# Patient Record
Sex: FEMALE | Race: WHITE | HISPANIC OR LATINO | URBAN - METROPOLITAN AREA
[De-identification: names, ages, dates, MRNs, and addresses within clinical notes are randomized per-mention and may not be internally consistent; named-entity substitution may affect disease eponyms.]

---

## 2017-11-26 ENCOUNTER — EMERGENCY (EMERGENCY)
Facility: HOSPITAL | Age: 27
LOS: 1 days | Discharge: HOME | End: 2017-11-26
Attending: EMERGENCY MEDICINE | Admitting: EMERGENCY MEDICINE
Payer: COMMERCIAL

## 2017-11-26 VITALS
HEART RATE: 68 BPM | DIASTOLIC BLOOD PRESSURE: 71 MMHG | OXYGEN SATURATION: 100 % | SYSTOLIC BLOOD PRESSURE: 106 MMHG | WEIGHT: 132.06 LBS | HEIGHT: 62 IN | TEMPERATURE: 98 F | RESPIRATION RATE: 16 BRPM

## 2017-11-26 PROCEDURE — 99284 EMERGENCY DEPT VISIT MOD MDM: CPT

## 2017-11-26 PROCEDURE — 73630 X-RAY EXAM OF FOOT: CPT

## 2017-11-26 PROCEDURE — 70450 CT HEAD/BRAIN W/O DYE: CPT | Mod: 26

## 2017-11-26 PROCEDURE — 73630 X-RAY EXAM OF FOOT: CPT | Mod: 26,RT

## 2017-11-26 PROCEDURE — 99284 EMERGENCY DEPT VISIT MOD MDM: CPT | Mod: 25

## 2017-11-26 PROCEDURE — 70450 CT HEAD/BRAIN W/O DYE: CPT

## 2017-11-26 RX ORDER — ACETAMINOPHEN 500 MG
650 TABLET ORAL ONCE
Qty: 0 | Refills: 0 | Status: COMPLETED | OUTPATIENT
Start: 2017-11-26 | End: 2017-11-26

## 2017-11-26 RX ADMIN — Medication 650 MILLIGRAM(S): at 16:07
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
## 2017-11-26 NOTE — ED PROVIDER NOTE - PROGRESS NOTE DETAILS
2751 Victor Hugo Robertson MD: The patient’s cervical collar was clinically cleared: there is no focal neurologic deficit, no midline cervical spine tenderness is present.  There is a normal level of consciousness, the patient is not intoxicated, and no distracting injury is present.   the ct head and neck are within normal limits, patient understands results and need to  follow up as needed.  No immediate life threatening issues present on history or clinical exam. Patient is a safe disposition home, has capacity and insight into their condition, ambulatory in the emergency department and will follow up with their doctor(s) this week. Patient  understands anticipatory guidance and was given strict return and follow up precautions. The patient has been informed of all concerning signs and symptoms to return to Emergency Department, the necessity to follow up with the PMD/Clinic/follow up provided within 2-3 days was explained, and the patient reports understanding of above with capacity and insight.

## 2017-11-26 NOTE — ED PROVIDER NOTE - PHYSICAL EXAMINATION
johnny aaaox3 nad ncat perrl no midline cspin e ttp gcs15 s1s2 rrrctabl stregth 5/5 ue le bl cn2-12intact no drift abd soft nt   rt foot echymosis ttp base of 5th sensation intact
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
## 2022-10-22 ENCOUNTER — EMERGENCY (EMERGENCY)
Facility: HOSPITAL | Age: 32
LOS: 1 days | Discharge: HOME | End: 2022-10-22
Attending: EMERGENCY MEDICINE
Payer: MEDICAID

## 2022-10-22 VITALS
DIASTOLIC BLOOD PRESSURE: 76 MMHG | TEMPERATURE: 98 F | RESPIRATION RATE: 18 BRPM | OXYGEN SATURATION: 98 % | HEART RATE: 66 BPM | SYSTOLIC BLOOD PRESSURE: 115 MMHG

## 2022-10-22 VITALS
HEIGHT: 62 IN | DIASTOLIC BLOOD PRESSURE: 81 MMHG | RESPIRATION RATE: 20 BRPM | SYSTOLIC BLOOD PRESSURE: 111 MMHG | HEART RATE: 70 BPM | TEMPERATURE: 98 F | WEIGHT: 145.95 LBS | OXYGEN SATURATION: 99 %

## 2022-10-22 PROCEDURE — 93971 EXTREMITY STUDY: CPT | Mod: 26,RT

## 2022-10-22 PROCEDURE — 99284 EMERGENCY DEPT VISIT MOD MDM: CPT

## 2022-10-22 PROCEDURE — 99284 EMERGENCY DEPT VISIT MOD MDM: CPT | Mod: 25

## 2022-10-22 PROCEDURE — 93971 EXTREMITY STUDY: CPT

## 2022-10-22 PROCEDURE — 73030 X-RAY EXAM OF SHOULDER: CPT | Mod: 26,RT

## 2022-10-22 PROCEDURE — 73030 X-RAY EXAM OF SHOULDER: CPT

## 2022-10-22 RX ORDER — ACETAMINOPHEN 500 MG
975 TABLET ORAL ONCE
Refills: 0 | Status: COMPLETED | OUTPATIENT
Start: 2022-10-22 | End: 2022-10-22

## 2022-10-22 RX ORDER — LIDOCAINE 4 G/100G
1 CREAM TOPICAL ONCE
Refills: 0 | Status: COMPLETED | OUTPATIENT
Start: 2022-10-22 | End: 2022-10-22

## 2022-10-22 RX ORDER — IBUPROFEN 200 MG
600 TABLET ORAL ONCE
Refills: 0 | Status: COMPLETED | OUTPATIENT
Start: 2022-10-22 | End: 2022-10-22

## 2022-10-22 RX ADMIN — Medication 975 MILLIGRAM(S): at 16:42

## 2022-10-22 RX ADMIN — Medication 600 MILLIGRAM(S): at 16:42

## 2022-10-22 RX ADMIN — LIDOCAINE 1 PATCH: 4 CREAM TOPICAL at 16:41

## 2022-10-22 NOTE — ED PROVIDER NOTE - PATIENT PORTAL LINK FT
You can access the FollowMyHealth Patient Portal offered by [facility] by registering at the following website: [email or web address]. By joining [email or web address]’s FollowMyHealth portal, you will also be able to view your health information using other applications (apps) compatible with our system.

## 2022-10-22 NOTE — ED PROVIDER NOTE - PHYSICAL EXAMINATION
NAD. VSS. Afebrile, Lungs clear. No spinal midline tender. +Right cervical trapezium tender. +Right ant/medial shoulder tender, full ROMs, no obvious swelling. N/V- intact.

## 2022-10-22 NOTE — ED PROVIDER NOTE - NSFOLLOWUPINSTRUCTIONS_ED_ALL_ED_FT
Please see the information of shoulder pain.    Take Ibuprofen or/Tylenol for pain as package directed.    Salonpas with Lidocaine patch to pain area as package directed.     Follow up with your orthopedist or sports medicine clinic ([phone]), call Monday for appointment.    Return for any concerns, fever, chills, numbness, weakness, or worsening pain.

## 2022-10-22 NOTE — ED PROVIDER NOTE - CLINICAL SUMMARY MEDICAL DECISION MAKING FREE TEXT BOX
R shoulder pain, atraumatic, described on medial/inner upper arm, less so on lateral.  No paresthesias or neuro deficits.  Soft compartments and good distal pulses.  Likely MSK strain/RTC syndrome but given location of pain (medial arm) will eval for deep venous thrombosis though less likely.  XR to eval for calc tendinitis.  Presuming non-actionable imaging will d/c c ortho /sports f/u.  --BMM

## 2022-10-22 NOTE — ED PROVIDER NOTE - NS ED ATTENDING STATEMENT MOD
This was a shared visit with the ELISE. I reviewed and verified the documentation and independently performed the documented:

## 2022-10-22 NOTE — ED ADULT NURSE NOTE - OBJECTIVE STATEMENT
31 y/o F A&Ox3 denies pertinent PMH/PSH presents to the ED from home c/o shoulder pain. Pt reports atraumatic right shoulder pain x1 weeks. Upon ED arrival pt is well appearing. Breathing is even and unlabored. Skin is warm, dry & in tact. On assessment pt has full ROM to right arm, positive sensation. No obvious signs of injury or gross deformity noted. Denies CP, SOB, HA, fever, chills, numbness, tingling. Comfort & safety provided.

## 2022-10-22 NOTE — ED PROVIDER NOTE - OBJECTIVE STATEMENT
no PMHx  right dominant pain for one week, no inj  Took Tylenol without relief. 31yo female pt, no PMHx, c/o right dominant pain for one week and noticed worsening pain with movement. Denies injury or heavy lifting. She also felt right sided neck pain. Denies headache, dizziness or visual changes. Denies sensory changes or weakness to extremities. She took Tylenol without relief. Denies CP/SOB/ABD pain. Denies fever, chills, cough or congestion.

## 2022-11-22 ENCOUNTER — APPOINTMENT (OUTPATIENT)
Dept: SPORTS MEDICINE | Facility: CLINIC | Age: 32
End: 2022-11-22

## 2022-11-22 DIAGNOSIS — M25.511 PAIN IN RIGHT SHOULDER: ICD-10-CM

## 2022-11-22 PROCEDURE — 99204 OFFICE O/P NEW MOD 45 MIN: CPT

## 2022-11-22 RX ORDER — MELOXICAM 7.5 MG/1
7.5 TABLET ORAL DAILY
Qty: 30 | Refills: 1 | Status: ACTIVE | COMMUNITY
Start: 2022-11-22 | End: 1900-01-01

## 2022-11-22 NOTE — PHYSICAL EXAM
[de-identified] : Shoulder Exam (Bilateral)\par \par General Appearance: Well-nourished, well developed in no acute distress Orientation: Oriented to person, place and time. Lungs: nonlabored breathing, no stridor, no audible wheezing\par \par C spine: full painless ROM, negative spurlings\par Inspection/Palpation UE (R/L): TTP to area just below right coracoid process\par \par Scapulothoracic motion: No scapular dyskinesis \par ROM: Active FF (R/L): 180 / 180, Passive FF (R/L): 180 / 180, \par Active Abduction (R/L): 170 / 170, Passive Abduction (R/L): 170 / 170, \par External Rotation at side (R/L): 60 / 60, Internal Rotation (R/L): T12/T12\par \par Abduction Strength (R/L): 5/5 / 5/5 \par ER strength (R/L): 5/5 / 5/5 \par IR strength (R/L): 5/5 / 5/5 \par Elbow flexion strength (R/L):5/5 / 5/5 \par Elbow Extension strength (R/L):5/5 / 5/5 \par Intrinsics of hand strength (R/L): 5/5 / 5/5\par \par Cross Arm Adduction (R/L): neg / neg\par Belly Press Test (R/L): neg /neg\par Lift off Test (R/L): neg /neg \par Drop arm (R/L): neg/neg \par Neer Impingement Test (R/L): pos / neg\par Empty Can Test (R/L): neg / neg \par Sharma Test (R/L): pos / neg\par Sheri’s Test (R/L): neg /neg \par Yergusons Test (R/L): neg / neg \par Speeds Test (R/L): pos / neg\par \par O’Pito’s Test (R/L): pos / neg \par Apprehension Test (R/L): neg /neg\par Apprehension Test (R/L): neg /neg\par Sulcus Sign (R/L): neg /neg\par \par Elbow Exam (Bilateral)\par \par General Appearance: Well-nourished, well developed in no acute distress \par Orientation: Oriented to person, place and time. \par Gait: normal Non-labored breathing, no stridor \par C spine: full painless ROM, no midline spinal tenderness \par \par Inspection/Palpation \par UE (R/L): mild TTP above medial cubital tunnel, but not over medial, lateral epicondyle and olecranon, \par No thenar or hypothenar atrophy \par Elbow ROM (R/L): \par Flexion 0-140/ 0-140, \par Supination (R/L): 80/80, \par Pronation (R/L): 80/80 \par Elbow Stability (R/L): no varus or valgus laxity bilaterally \par Elbow Flexion Strength (R/L): 5/5 / 5/5\par Elbow Extension Strength (R/L): 5/5 / 5/5 \par Wrist Extension Strength (R/L): 5/5 / 5/5\par Wrist Flexion Strength (R/L): 5/5 / 5/5 \par Intrinsics of Hand Strength (R/L): 5/5 / 5/5 \par Sensation: Subjective normal median, ulnar, radial and axillary sensation bilaterally\par mild Ulnar distribution numbness with elbow flexion for >30 seconds does occur\par \par Tinel’s Sign (R/L): neg /neg\par Calderón Test (R/L): neg /neg (elbow extension, wrist pronation and flexion)\par Maudley’s Test (R/L): neg /neg (resisted middle digit extension)\par Golfer’s elbow Test (R/L): neg /neg (resisted elbow and wrist extension)\par Pain with passive extension of wrist and fingers (R/L): neg /neg \par Vasculature: 2+ radial pulse bilaterally \par UE Skin (R/L): no rashes or lesions bilaterally \par DTR UE (R/L): Biceps (2+/2+), Triceps (2+/2+)   [de-identified] : X-ray of right shoulder performed at [facility]  ED on 10/22/2022: No acute fracture or dislocation

## 2022-11-22 NOTE — HISTORY OF PRESENT ILLNESS
[de-identified] : 32-year-old female with no significant past medical history presenting with acute pain of the right elbow and shoulder that began approximately 3 weeks ago.  Patient states that she woke up 1 morning to have this pain occurring in the area.  Cannot recall an inciting incident.  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  Does not engage in any significant physical activity otherwise.  Has never had issues with in this upper extremity in the past.  Reports that she is having increasing trouble lifting her arm up over her head, reports occasional shooting pain going down the inside aspect of her right arm, reports occasional numbness and tingling in her pinky and ring finger in the hand of that arm.  Has been taking the Tylenol with only mild relief.  Denies any neck pain, vision or hearing changes.was seen at the [facility] ED and had a right shoulder that did not reveal any jo ann pathology.

## 2022-11-22 NOTE — DISCUSSION/SUMMARY
[de-identified] : Chaitanya fellow note:\par \par Impression: Right shoulder internal derangement versus nerve entrapment.\par \par Physical exam suggesting some signs of internal shoulder derangement with issues with abduction as well as signs of impingement, patient noted to have deconditioned muscles in the right upper extremity, will advise patient to follow-up with physical therapy, prescription provided, will order MRI of the right shoulder, patient advised to return for follow-up in 2 weeks following MRI results.  Patient understood instructions, was provided with a prescription for Mobic and discharged.\par \par Attending Attestation:\par \par I saw and evaluated the patient and was involved with and agree with the fellow's history, physical exam, and I personally documented the assessment and plan of care.\par \par ASSESSMENT: \par \par Right-hand-dominant patient who works as a  who presents with right shoulder pain for the last several weeks.  No trauma or acute inciting event.  Patient's pain is noted over the medial aspects of the shoulder and radiates to the medial elbow, as well as getting sharp pain and tingling and numbness to the fourth and fifth digit.  Patient without any neck pain associated.\par \par Consider shoulder pathology including rotator cuff tendinopathy including the supraspinatus and subscapularis, short head of biceps at the insertion on the coracoid, shoulder impingement, bursitis, labral tear, biceps tendinopathy.  We will obtain MRI to assess for acute shoulder pathology.\par Patient also likely has peripheral neuropathy involving the ulnar nerve.  Patient with no neck pain, and on examination has normal range of motion of the neck which does not reproduce symptoms and negative Spurling test.  There is no overt numbness, decreased sensation or decreased strength on examination.  Likely has ulnar nerve impingement at the level of the upper arm.  We will trial a course of NSAID, and reevaluate patient, will consider ultrasound-guided ulnar nerve Hydro dissection at the mid upper arm and possible corticosteroid injection.\par \par Will treat symptoms with conservative management with activity modification, analgesic medications, HEP/PT, and re-evaluate the patient to see if they would benefit from further diagnostic testing, or referral for injection therapy or surgical intervention.\par \par Clinical and radiographic findings discussed. Diagnosis, prognosis and treatment options reviewed. Patient verbalizes understanding and all questions answered.\par The patient adamant at this time about not having any type of surgical intervention. Wants only to continue with non-operative management. \par \par Patient adamant at this time about not having any type of Sx intervention. Wants only to continue with conservative approach.\par \par PLAN:\par Additional Testing: MRI of the right shoulder\par Further Intervention: Patient would like to continue with non-interventional measures\par Weight-bearing Status: WBAT\par Assistive Devices: None\par Therapy: HEP recommended and reviewed, PT prescribed\par Health Management: BMI/Weight Management and physical activity level reviewed with the patient\par Home Modalities: RICE protocol for pain/swelling and home modalities reviewed with the patient\par Medications: OTC analgesics\par -Prescription for Meloxicam. Patient reports that she is not pregnant. Advised about adverse effects and recommended taking medication with food, and to avoid if there is any GI upset or history of renal issues, as there is potential to cause PUD, GIB, and JACQUES if taken chronically or in large doses. I recommended the patient to primarily take Tylenol for pain and take the prescribed NSAID sparingly. Recommended to avoid other OTC NSAIDs while on this medication. \par Orthotics: None\par Work Status: Return to work as tolerated\par Activity Status: Avoid aggravating and high impact activities\par Sports/Gym Status: No gym or sports until cleared medically\par Follow-up: 2-3 weeks\par  \par \par Alonso Tavares MD

## 2022-12-07 ENCOUNTER — APPOINTMENT (OUTPATIENT)
Dept: MRI IMAGING | Facility: CLINIC | Age: 32
End: 2022-12-07

## 2022-12-07 ENCOUNTER — RESULT REVIEW (OUTPATIENT)
Age: 32
End: 2022-12-07

## 2022-12-07 ENCOUNTER — OUTPATIENT (OUTPATIENT)
Dept: OUTPATIENT SERVICES | Facility: HOSPITAL | Age: 32
LOS: 1 days | End: 2022-12-07
Payer: MEDICAID

## 2022-12-07 DIAGNOSIS — Z00.8 ENCOUNTER FOR OTHER GENERAL EXAMINATION: ICD-10-CM

## 2022-12-07 DIAGNOSIS — M25.511 PAIN IN RIGHT SHOULDER: ICD-10-CM

## 2022-12-07 PROCEDURE — 73221 MRI JOINT UPR EXTREM W/O DYE: CPT | Mod: 26,RT

## 2022-12-07 PROCEDURE — 73221 MRI JOINT UPR EXTREM W/O DYE: CPT

## 2022-12-20 ENCOUNTER — APPOINTMENT (OUTPATIENT)
Dept: SPORTS MEDICINE | Facility: CLINIC | Age: 32
End: 2022-12-20
Payer: MEDICAID

## 2022-12-20 DIAGNOSIS — G56.21 LESION OF ULNAR NERVE, RIGHT UPPER LIMB: ICD-10-CM

## 2022-12-20 PROCEDURE — 99214 OFFICE O/P EST MOD 30 MIN: CPT

## 2022-12-20 RX ORDER — MELOXICAM 7.5 MG/1
7.5 TABLET ORAL DAILY
Qty: 30 | Refills: 1 | Status: ACTIVE | COMMUNITY
Start: 2022-12-20 | End: 1900-01-01

## 2022-12-20 NOTE — PHYSICAL EXAM
[de-identified] : Shoulder Exam (Bilateral)\par \par General Appearance: Well-nourished, well developed in no acute distress Orientation: Oriented to person, place and time. Lungs: nonlabored breathing, no stridor, no audible wheezing\par \par C spine: full painless ROM, negative spurlings\par Inspection/Palpation UE (R/L): TTP to area just lateral to right bicipital groove and medial right upper bicep/ LEft side no TTP\par \par Scapulothoracic motion: No scapular dyskinesis \par ROM: Active FF (R/L): 180 / 180, Passive FF (R/L): 180 / 180, \par Active Abduction (R/L): 170 / 170, Passive Abduction (R/L): 170 / 170, \par External Rotation at side (R/L): 60 / 60, Internal Rotation (R/L): T12/T12\par \par Abduction Strength (R/L): 4/5 / 5/5 \par ER strength (R/L): 5/5 / 5/5 \par IR strength (R/L): 4/5 / 5/5 \par Elbow flexion strength (R/L):5/5 / 5/5 \par Elbow Extension strength (R/L):5/5 / 5/5 \par Intrinsics of hand strength (R/L): 4/5 (w noted weak Fromans) / 5/5 \par \par Cross Arm Adduction (R/L): neg / neg\par Belly Press Test (R/L): neg /neg\par Lift off Test (R/L): neg /neg \par Drop arm (R/L): neg/neg \par Neer Impingement Test (R/L): pos / neg\par Empty Can Test (R/L): neg / neg \par Sharma Test (R/L): pos / neg\par Sheri’s Test (R/L): neg /neg \par [place]ergusons Test (R/L): neg / neg \par Speeds Test (R/L): neg / neg\par \par O’Pito’s Test (R/L): pos / neg \par Apprehension Test (R/L): neg /neg\par Apprehension Test (R/L): neg /neg\par Sulcus Sign (R/L): neg /neg\par \par Elbow Exam (Bilateral)\par \par General Appearance: Well-nourished, well developed in no acute distress \par Orientation: Oriented to person, place and time. \par Gait: normal Non-labored breathing, no stridor \par C spine: full painless ROM, no midline spinal tenderness \par \par Inspection/Palpation \par UE (R/L): mild TTP above medial cubital tunnel, but not over medial, lateral epicondyle and olecranon, \par No thenar or hypothenar atrophy \par Elbow ROM (R/L): \par Flexion 0-140/ 0-140, \par Supination (R/L): 80/80, \par Pronation (R/L): 80/80 \par Elbow Stability (R/L): no varus or valgus laxity bilaterally \par Elbow Flexion Strength (R/L): 5/5 / 5/5\par Elbow Extension Strength (R/L): 5/5 / 5/5 \par Wrist Extension Strength (R/L): 5/5 / 5/5\par Wrist Flexion Strength (R/L): 5/5 / 5/5 \par Intrinsics of Hand Strength (R/L): 4/5 (froman's pos) / 5/5 \par Sensation: Subjective normal median, ulnar, radial and axillary sensation bilaterally\par mild Ulnar distribution numbness with elbow flexion for >30 seconds does occur\par \par Tinel’s Sign (R/L): neg /neg\par Calderón Test (R/L): neg /neg (elbow extension, wrist pronation and flexion)\par Maudley’s Test (R/L): neg /neg (resisted middle digit extension)\par Golfer’s elbow Test (R/L): neg /neg (resisted elbow and wrist extension)\par Pain with passive extension of wrist and fingers (R/L): neg /neg \par Vasculature: 2+ radial pulse bilaterally \par UE Skin (R/L): no rashes or lesions bilaterally \par DTR UE (R/L): Biceps (2+/2+), Triceps (2+/2+)   [de-identified] : X-ray of right shoulder performed at [facility] ED on 10/22/2022: No acute fracture or dislocation\par \par MRI of the right shoulder performed on 12/7/2022:\par 1.  Rotator cuff and labrum appear intact.\par 2.  There is mild thickening and edema involving the subacromial/subdeltoid bursa that could reflect mild bursitis.

## 2022-12-20 NOTE — DISCUSSION/SUMMARY
[de-identified] : Chaitanya fellow note:\par \par Impression: Thoracic outlet syndrome versus ulnar nerve entrapment\par \par Physical exam suggesting some signs of internal shoulder derangement with issues with abduction as well as signs of ulnar nerve impingement, patient noted to have deconditioned muscles in the right upper extremity, will advise patient to follow-up with physical therapy, prescription provided for Mobic, advised to return in 3 weeks if physical therapy does not improve her symptoms for planned nerve entrapment hydrodissection.  Prescription provided for Mobic and physical therapy.\par \par \par Attending Attestation:\par \par I saw and evaluated the patient and was involved with and agree with the fellow's history, physical exam, and I personally documented the assessment and plan of care.\par \par ASSESSMENT: \par \par Right-hand-dominant patient who works as a  who presents with right anterior shoulder pain and medial upper arm pain for the last several weeks.  No trauma or acute inciting event.  Patient's pain is noted over the medial aspects of the shoulder and radiates to the medial elbow, as well as getting sharp pain and tingling and numbness to the fourth and fifth digit.\par \par Consider shoulder pathology including rotator cuff tendinopathy including the supraspinatus and subscapularis, short head of biceps at the insertion on the coracoid, shoulder impingement, bursitis, labral tear, biceps tendinopathy.  But MRI showed rotator cuff and labrum intact, and has mild SASD bursitis, which is likely only partially contributing to patient's symptoms.\par \par Patient also likely has peripheral neuropathy involving the ulnar nerve, ULTT4 positive with reproduction of patients symptoms.  Patient with no neck pain, and on examination has normal range of motion of the neck which does not reproduce symptoms and negative Spurling test.  Mild decreased strength in ulnar nerve distribution on examination.  Likely has ulnar nerve impingement at the level of the upper arm. Locations of impingement may include between the pectoralis minor and pec major muscles or at the mid-medial humerus, more likely than at the [place] of [place] or Cubittal Tunnel.  We will trial a course of NSAID, and reevaluate patient, will consider ultrasound-guided ulnar nerve Hydrodissection at the mid upper arm and possible corticosteroid injection. If not improving, consider further imaging vs EMG/NCS. \par \par Will treat symptoms with conservative management with activity modification, analgesic medications, HEP/PT, and re-evaluate the patient to see if they would benefit from further diagnostic testing, or referral for injection therapy or surgical intervention.\par \par Clinical and radiographic findings discussed. Diagnosis, prognosis and treatment options reviewed. Patient verbalizes understanding and all questions answered.\par The patient adamant at this time about not having any type of surgical intervention. Wants only to continue with non-operative management. \par \par Patient adamant at this time about not having any type of Sx intervention. Wants only to continue with conservative approach.\par \par PLAN:\par Additional Testing: MRI of the right shoulder reviewed\par Further Intervention: Patient would like to continue with non-interventional measures\par Weight-bearing Status: WBAT\par Assistive Devices: None\par Therapy: HEP recommended and reviewed, PT prescribed\par Health Management: BMI/Weight Management and physical activity level reviewed with the patient\par Home Modalities: RICE protocol for pain/swelling and home modalities reviewed with the patient\par Medications: OTC analgesics\par -Prescription for Meloxicam. Patient reports that she is not pregnant. Advised about adverse effects and recommended taking medication with food, and to avoid if there is any GI upset or history of renal issues, as there is potential to cause PUD, GIB, and JACQUES if taken chronically or in large doses. I recommended the patient to primarily take Tylenol for pain and take the prescribed NSAID sparingly. Recommended to avoid other OTC NSAIDs while on this medication. \par Orthotics: None\par Work Status: Return to work as tolerated\par Activity Status: Avoid aggravating and high impact activities\par Sports/Gym Status: No gym or sports until cleared medically\par Follow-up: 3 weeks\par  \par \par Alonso Tavares MD

## 2022-12-20 NOTE — REASON FOR VISIT
[Follow-Up Visit] : a follow-up visit for [Shoulder Pain] : shoulder pain [FreeTextEntry2] : R Shoulder and arm pain

## 2022-12-20 NOTE — HISTORY OF PRESENT ILLNESS
[de-identified] : 32-year-old female with no significant past medical history presenting with chronic pain of the right upper arm and shoulder that began approximately 7 weeks ago, previously seen here in November 22.  Interim MRI demonstrates no significant acute right shoulder pathology other than mild SASD bursitis.  Patient states that she has not taken any medications and has not attended any physical therapy, pain has not improved and appears to be in the right anterior shoulder as well as the right inner medial bicep.  Pain worsened with lifting up her arm, states she also occasionally feels it just lying down, feels it occasionally shooting down her arm, but denies any numbness, tingling, paresthesias.  Denies any neck pain or headache.

## 2023-01-10 ENCOUNTER — APPOINTMENT (OUTPATIENT)
Dept: SPORTS MEDICINE | Facility: CLINIC | Age: 33
End: 2023-01-10
Payer: MEDICAID

## 2023-01-10 PROCEDURE — 99214 OFFICE O/P EST MOD 30 MIN: CPT | Mod: 25

## 2023-01-10 PROCEDURE — 64450 NJX AA&/STRD OTHER PN/BRANCH: CPT

## 2023-01-10 NOTE — HISTORY OF PRESENT ILLNESS
[de-identified] : 32-year-old female with no significant past medical history presenting with chronic pain of the right upper arm and shoulder that began several months ago.  MRI demonstrates no significant acute right shoulder pathology other than mild SASD bursitis.  Patient states that she has not taken any medications and has only attended one physical therapy session, which she states slightly improved her pain. Pain worsened with lifting up her arm, states she also occasionally feels it just lying down, feels it occasionally shooting down her arm, but denies any numbness, tingling, paresthesias. Appears to be in the ulnar distribution.  Denies any neck pain or headache, hearing changes, vision changes. \par

## 2023-01-10 NOTE — PROCEDURE
[de-identified] : Ultrasound-guided Hydro dissection of ulnar nerve of right arm:\par \par Once maximal point of tenderness was appreciated and visualized on ultrasound as the ulnar nerve in the medial mid right upper arm, the area was sanitized with iodine, sprayed down with ethyl chloride for anesthesia and then resanitized with alcohol.  Approximately 4 cc of 1% lidocaine as well as 10 mg of dexamethasone were injected into the area surrounding the ulnar nerve, with a Hydro dissecting plane appreciated.  Patient tolerated the procedure well, with no blood loss appreciated. Post-procedure exam showed decreased sensation to the ulnar distribution, but normal strength of ulnar motor functions.

## 2023-01-10 NOTE — DISCUSSION/SUMMARY
[de-identified] : Chaitanya fellow note:\par \par Impression: Thoracic outlet syndrome versus ulnar nerve entrapment\par \par Physical exam suggesting signs of ulnar nerve impingement, patient noted to have deconditioned muscles: After discussing patient's course with patient herself, decision was made jointly to pursue a Hydro dissection of the nerve which is noted in the procedure note above, patient tolerated procedure well, reported some relief of her symptoms though she does report that she still having symptoms proximal to the area that was injected in the mid right upper arm, patient encouraged to continue physical therapy and to come back in 3 to 4 weeks should symptoms continue to perform more proximal Hydro dissection, patient understood and was discharged understanding recommendations\par \par \par Attending Attestation:\par \par I saw and evaluated the patient and was involved with and agree with the fellow's history, physical exam, and I personally documented the assessment and plan of care.\par \par ASSESSMENT: \par \par Right-hand-dominant patient who works as a  who presents with right anterior shoulder pain and medial upper arm pain for the last several months. No trauma or acute inciting event.  Patient's pain is noted over the medial aspects of the shoulder and radiates to the medial elbow, as well as getting sharp pain and tingling and numbness to the fourth and fifth digit.\par \par Consider shoulder pathology including rotator cuff tendinopathy including the supraspinatus and subscapularis, short head of biceps at the insertion on the coracoid, shoulder impingement, bursitis, labral tear, biceps tendinopathy.  But MRI showed rotator cuff and labrum intact, and has mild SASD bursitis, which is likely only partially contributing to patient's symptoms.\par \par Patient also likely has peripheral neuropathy involving the ulnar nerve, ULTT4 positive with reproduction of patients symptoms.  Patient with no neck pain, and on examination has normal range of motion of the neck which does not reproduce symptoms and negative Spurling test.  Mild decreased strength in ulnar nerve distribution on examination.  Likely has ulnar nerve impingement at the level of the upper arm. Locations of impingement may include between the pectoralis minor and pec major muscles or at the mid-medial humerus, more likely than at the [place] or [place].  \par \par -> 1/10/23: USG ulnar nerve hydrodissection with lidocaine and dexamethasone solution at the mid-upper arm at the point of maximal tenderness, resulting in improvement of her upper arm symptoms and neuropathic symptoms, indicating likely cause of patients symptoms. \par \par Will continue with NSAID, PT, and reevaluate patient, will consider repeat ultrasound-guided ulnar nerve Hydrodissection more proximally if not persistent. If not improving, consider further imaging vs EMG/NCS. \par \par Will treat symptoms with conservative management with activity modification, analgesic medications, HEP/PT, and re-evaluate the patient to see if they would benefit from further diagnostic testing, or referral for injection therapy or surgical intervention.\par \par Clinical and radiographic findings discussed. Diagnosis, prognosis and treatment options reviewed. Patient verbalizes understanding and all questions answered.\par The patient adamant at this time about not having any type of surgical intervention. Wants only to continue with non-operative management. \par \par Patient adamant at this time about not having any type of Sx intervention. Wants only to continue with conservative approach.\par \par PLAN:\par Additional Testing: MRI of the right shoulder reviewed\par Further Intervention: Patient would like to continue with non-interventional as well as interventional measures\par Weight-bearing Status: WBAT\par Assistive Devices: None\par Therapy: HEP recommended and reviewed, PT prescribed\par Health Management: BMI/Weight Management and physical activity level reviewed with the patient\par Home Modalities: RICE protocol for pain/swelling and home modalities reviewed with the patient\par Medications: OTC analgesics\par -Prescription for Meloxicam. Patient reports that she is not pregnant. Advised about adverse effects and recommended taking medication with food, and to avoid if there is any GI upset or history of renal issues, as there is potential to cause PUD, GIB, and JACQUES if taken chronically or in large doses. I recommended the patient to primarily take Tylenol for pain and take the prescribed NSAID sparingly. Recommended to avoid other OTC NSAIDs while on this medication. \par Orthotics: None\par Work Status: Return to work as tolerated\par Activity Status: Avoid aggravating and high impact activities\par Sports/Gym Status: No gym or sports until cleared medically\par Follow-up: 3-4 weeks\par  \par \par Alonso Tavares MD

## 2023-01-10 NOTE — PHYSICAL EXAM
[de-identified] : Shoulder Exam (Bilateral)\par \par General Appearance: Well-nourished, well developed in no acute distress Orientation: Oriented to person, place and time. Lungs: nonlabored breathing, no stridor, no audible wheezing\par \par C spine: full painless ROM, negative spurlings\par Inspection/Palpation UE (R/L): TTP to area just lateral to right bicipital groove and medial right upper bicep/ LEft side no TTP\par \par Scapulothoracic motion: No scapular dyskinesis \par ROM: Active FF (R/L): 180 / 180, Passive FF (R/L): 180 / 180, \par Active Abduction (R/L): 170 / 170, Passive Abduction (R/L): 170 / 170, \par External Rotation at side (R/L): 60 / 60, Internal Rotation (R/L): T12/T12\par \par Abduction Strength (R/L): 4/5 / 5/5 \par ER strength (R/L): 5/5 / 5/5 \par IR strength (R/L): 4/5 / 5/5 \par Elbow flexion strength (R/L):5/5 / 5/5 \par Elbow Extension strength (R/L):5/5 / 5/5 \par Intrinsics of hand strength (R/L): 4/5 (w noted weak Fromans) / 5/5 \par \par Cross Arm Adduction (R/L): neg / neg\par Belly Press Test (R/L): neg /neg\par Lift off Test (R/L): neg /neg \par Drop arm (R/L): neg/neg \par Neer Impingement Test (R/L): pos / neg\par Empty Can Test (R/L): neg / neg \par Sharma Test (R/L): pos / neg\par Sheri’s Test (R/L): neg /neg \par [place] Test (R/L): neg / neg \par Speeds Test (R/L): neg / neg\par \par O’Pito’s Test (R/L): pos / neg \par Apprehension Test (R/L): neg /neg\par Apprehension Test (R/L): neg /neg\par Sulcus Sign (R/L): neg /neg\par \par Elbow Exam (Bilateral)\par \par General Appearance: Well-nourished, well developed in no acute distress \par Orientation: Oriented to person, place and time. \par Gait: normal Non-labored breathing, no stridor \par C spine: full painless ROM, no midline spinal tenderness \par \par Inspection/Palpation \par UE (R/L): mild TTP above medial cubital tunnel, but not over medial, lateral epicondyle and olecranon, \par No thenar or hypothenar atrophy \par Elbow ROM (R/L): \par Flexion 0-140/ 0-140, \par Supination (R/L): 80/80, \par Pronation (R/L): 80/80 \par Elbow Stability (R/L): no varus or valgus laxity bilaterally \par Elbow Flexion Strength (R/L): 5/5 / 5/5\par Elbow Extension Strength (R/L): 5/5 / 5/5 \par Wrist Extension Strength (R/L): 5/5 / 5/5\par Wrist Flexion Strength (R/L): 5/5 / 5/5 \par Intrinsics of Hand Strength (R/L): 4/5 (froman's pos) / 5/5 \par Sensation: Subjective normal median, ulnar, radial and axillary sensation bilaterally\par mild Ulnar distribution numbness with elbow flexion for >30 seconds does occur\par \par Tinel’s Sign (R/L): neg /neg\par Calderón Test (R/L): neg /neg (elbow extension, wrist pronation and flexion)\par Maudley’s Test (R/L): neg /neg (resisted middle digit extension)\par Golfer’s elbow Test (R/L): neg /neg (resisted elbow and wrist extension)\par Pain with passive extension of wrist and fingers (R/L): neg /neg \par Vasculature: 2+ radial pulse bilaterally \par UE Skin (R/L): no rashes or lesions bilaterally \par DTR UE (R/L): Biceps (2+/2+), Triceps (2+/2+)   [de-identified] : X-ray of right shoulder performed at [facility] ED on 10/22/2022: No acute fracture or dislocation\par \par MRI of the right shoulder performed on 12/7/2022:\par 1.  Rotator cuff and labrum appear intact.\par 2.  There is mild thickening and edema involving the subacromial/subdeltoid bursa that could reflect mild bursitis.

## 2023-02-07 ENCOUNTER — APPOINTMENT (OUTPATIENT)
Dept: SPORTS MEDICINE | Facility: CLINIC | Age: 33
End: 2023-02-07

## 2024-01-24 NOTE — ED ADULT NURSE NOTE - CADM POA PRESS ULCER
GOAL: Patient will perform 6 steps independently using least restrictive device to enter bedroom in 2 weeks./balance training/bed mobility training/gait training/transfer training
No

## 2024-05-14 NOTE — ED ADULT TRIAGE NOTE - ARRIVAL FROM
Home
PAST SURGICAL HISTORY:  H/O hand surgery     H/O parathyroidectomy     History of back surgery     History of      History of laminectomy with resection    History of tonsillectomy     S/P cataract surgery B/L    S/P cholecystectomy

## 2024-08-15 NOTE — ED ADULT NURSE NOTE - ISOLATION TYPE:
Colonoscopy and biopsies all negative.    Will check tryptase (vis a vis systemic mastocytosis.)
None

## 2024-10-01 ENCOUNTER — EMERGENCY (EMERGENCY)
Facility: HOSPITAL | Age: 34
LOS: 1 days | Discharge: HOME | End: 2024-10-01
Attending: STUDENT IN AN ORGANIZED HEALTH CARE EDUCATION/TRAINING PROGRAM
Payer: SELF-PAY

## 2024-10-01 VITALS
OXYGEN SATURATION: 99 % | HEART RATE: 60 BPM | TEMPERATURE: 97 F | SYSTOLIC BLOOD PRESSURE: 119 MMHG | WEIGHT: 149.91 LBS | RESPIRATION RATE: 18 BRPM | DIASTOLIC BLOOD PRESSURE: 81 MMHG | HEIGHT: 63 IN

## 2024-10-01 PROCEDURE — 99284 EMERGENCY DEPT VISIT MOD MDM: CPT | Mod: 25

## 2024-10-01 PROCEDURE — 96365 THER/PROPH/DIAG IV INF INIT: CPT

## 2024-10-01 PROCEDURE — 96375 TX/PRO/DX INJ NEW DRUG ADDON: CPT

## 2024-10-01 PROCEDURE — 99284 EMERGENCY DEPT VISIT MOD MDM: CPT

## 2024-10-01 RX ORDER — DIPHENHYDRAMINE HCL 50 MG
25 CAPSULE ORAL ONCE
Refills: 0 | Status: COMPLETED | OUTPATIENT
Start: 2024-10-01 | End: 2024-10-01

## 2024-10-01 RX ORDER — SODIUM CHLORIDE 9 MG/ML
1000 INJECTION INTRAMUSCULAR; INTRAVENOUS; SUBCUTANEOUS ONCE
Refills: 0 | Status: COMPLETED | OUTPATIENT
Start: 2024-10-01 | End: 2024-10-01

## 2024-10-01 RX ORDER — METOCLOPRAMIDE HCL 5 MG
10 TABLET ORAL ONCE
Refills: 0 | Status: COMPLETED | OUTPATIENT
Start: 2024-10-01 | End: 2024-10-01

## 2024-10-01 RX ORDER — ACETAMINOPHEN 325 MG/1
1000 TABLET ORAL ONCE
Refills: 0 | Status: COMPLETED | OUTPATIENT
Start: 2024-10-01 | End: 2024-10-01

## 2024-10-01 RX ADMIN — ACETAMINOPHEN 400 MILLIGRAM(S): 325 TABLET ORAL at 18:53

## 2024-10-01 RX ADMIN — Medication 25 MILLIGRAM(S): at 18:53

## 2024-10-01 RX ADMIN — Medication 10 MILLIGRAM(S): at 18:53

## 2024-10-01 RX ADMIN — SODIUM CHLORIDE 1000 MILLILITER(S): 9 INJECTION INTRAMUSCULAR; INTRAVENOUS; SUBCUTANEOUS at 19:53

## 2024-10-01 RX ADMIN — ACETAMINOPHEN 1000 MILLIGRAM(S): 325 TABLET ORAL at 19:53

## 2024-10-01 RX ADMIN — SODIUM CHLORIDE 1000 MILLILITER(S): 9 INJECTION INTRAMUSCULAR; INTRAVENOUS; SUBCUTANEOUS at 18:51

## 2024-10-01 NOTE — ED PROVIDER NOTE - PRO INTERPRETER NEED 2
English Crescentic Complex Repair Preamble Text (Leave Blank If You Do Not Want): Extensive wide undermining was performed.
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
## 2024-10-01 NOTE — ED PROVIDER NOTE - PATIENT PORTAL LINK FT
You can access the FollowMyHealth Patient Portal offered by [facility] by registering at the following website: [email or web address]. By joining [employer]’s FollowMyHealth portal, you will also be able to view your health information using other applications (apps) compatible with our system.

## 2024-10-01 NOTE — ED ADULT NURSE NOTE - NSFALLUNIVINTERV_ED_ALL_ED
Bed/Stretcher in lowest position, wheels locked, appropriate side rails in place/Call bell, personal items and telephone in reach/Instruct patient to call for assistance before getting out of bed/chair/stretcher/Non-slip footwear applied when patient is off stretcher/[place] to call system/Physically safe environment - no spills, clutter or unnecessary equipment/Purposeful proactive rounding/Room/bathroom lighting operational, light cord in reach
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
## 2024-10-04 ENCOUNTER — EMERGENCY (EMERGENCY)
Facility: HOSPITAL | Age: 34
LOS: 1 days | Discharge: HOME | End: 2024-10-04
Attending: EMERGENCY MEDICINE
Payer: COMMERCIAL

## 2024-10-04 VITALS
OXYGEN SATURATION: 97 % | HEART RATE: 85 BPM | HEIGHT: 63 IN | SYSTOLIC BLOOD PRESSURE: 108 MMHG | TEMPERATURE: 98 F | RESPIRATION RATE: 18 BRPM | WEIGHT: 149.91 LBS | DIASTOLIC BLOOD PRESSURE: 73 MMHG

## 2024-10-04 PROCEDURE — 99284 EMERGENCY DEPT VISIT MOD MDM: CPT

## 2024-10-04 PROCEDURE — 81025 URINE PREGNANCY TEST: CPT

## 2024-10-04 PROCEDURE — 99284 EMERGENCY DEPT VISIT MOD MDM: CPT | Mod: 25

## 2024-10-04 PROCEDURE — 72125 CT NECK SPINE W/O DYE: CPT | Mod: MC

## 2024-10-04 PROCEDURE — 72125 CT NECK SPINE W/O DYE: CPT | Mod: 26,MC

## 2024-10-04 RX ORDER — LIDOCAINE 50 MG/G
1 CREAM TOPICAL ONCE
Refills: 0 | Status: COMPLETED | OUTPATIENT
Start: 2024-10-04 | End: 2024-10-04

## 2024-10-04 RX ORDER — ACETAMINOPHEN 325 MG
975 TABLET ORAL ONCE
Refills: 0 | Status: COMPLETED | OUTPATIENT
Start: 2024-10-04 | End: 2024-10-04

## 2024-10-04 RX ADMIN — LIDOCAINE 1 PATCH: 50 CREAM TOPICAL at 14:44

## 2024-10-04 RX ADMIN — Medication 600 MILLIGRAM(S): at 14:44

## 2024-10-04 RX ADMIN — Medication 975 MILLIGRAM(S): at 14:44
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
## 2024-10-04 NOTE — ED PROVIDER NOTE - PATIENT PORTAL LINK FT
You can access the FollowMyHealth Patient Portal offered by [facility] by registering at the following website: [email or web address]. By joining [employer]’s FollowMyHealth portal, you will also be able to view your health information using other applications (apps) compatible with our system.

## 2025-05-09 ENCOUNTER — EMERGENCY (EMERGENCY)
Facility: HOSPITAL | Age: 35
LOS: 1 days | End: 2025-05-09
Attending: EMERGENCY MEDICINE
Payer: MEDICAID

## 2025-05-09 VITALS
TEMPERATURE: 98 F | HEIGHT: 62 IN | RESPIRATION RATE: 18 BRPM | DIASTOLIC BLOOD PRESSURE: 74 MMHG | SYSTOLIC BLOOD PRESSURE: 110 MMHG | HEART RATE: 60 BPM | OXYGEN SATURATION: 100 % | WEIGHT: 139.99 LBS

## 2025-05-09 VITALS
RESPIRATION RATE: 18 BRPM | DIASTOLIC BLOOD PRESSURE: 62 MMHG | OXYGEN SATURATION: 98 % | SYSTOLIC BLOOD PRESSURE: 106 MMHG | TEMPERATURE: 98 F | HEART RATE: 66 BPM

## 2025-05-09 LAB
ALBUMIN SERPL ELPH-MCNC: 4.6 G/DL — SIGNIFICANT CHANGE UP (ref 3.3–5)
ALP SERPL-CCNC: 58 U/L — SIGNIFICANT CHANGE UP (ref 40–120)
ALT FLD-CCNC: 11 U/L — SIGNIFICANT CHANGE UP (ref 10–45)
ANION GAP SERPL CALC-SCNC: 12 MMOL/L — SIGNIFICANT CHANGE UP (ref 5–17)
APPEARANCE UR: CLEAR — SIGNIFICANT CHANGE UP
AST SERPL-CCNC: 17 U/L — SIGNIFICANT CHANGE UP (ref 10–40)
BACTERIA # UR AUTO: NEGATIVE /HPF — SIGNIFICANT CHANGE UP
BASOPHILS # BLD AUTO: 0.03 K/UL — SIGNIFICANT CHANGE UP (ref 0–0.2)
BASOPHILS NFR BLD AUTO: 0.3 % — SIGNIFICANT CHANGE UP (ref 0–2)
BILIRUB SERPL-MCNC: 0.1 MG/DL — LOW (ref 0.2–1.2)
BILIRUB UR-MCNC: NEGATIVE — SIGNIFICANT CHANGE UP
BUN SERPL-MCNC: 9 MG/DL — SIGNIFICANT CHANGE UP (ref 7–23)
CALCIUM SERPL-MCNC: 9.2 MG/DL — SIGNIFICANT CHANGE UP (ref 8.4–10.5)
CAST: 0 /LPF — SIGNIFICANT CHANGE UP (ref 0–4)
CHLORIDE SERPL-SCNC: 105 MMOL/L — SIGNIFICANT CHANGE UP (ref 96–108)
CO2 SERPL-SCNC: 23 MMOL/L — SIGNIFICANT CHANGE UP (ref 22–31)
COLOR SPEC: YELLOW — SIGNIFICANT CHANGE UP
CREAT SERPL-MCNC: 0.71 MG/DL — SIGNIFICANT CHANGE UP (ref 0.5–1.3)
DIFF PNL FLD: ABNORMAL
EGFR: 116 ML/MIN/1.73M2 — SIGNIFICANT CHANGE UP
EGFR: 116 ML/MIN/1.73M2 — SIGNIFICANT CHANGE UP
EOSINOPHIL # BLD AUTO: 0.15 K/UL — SIGNIFICANT CHANGE UP (ref 0–0.5)
EOSINOPHIL NFR BLD AUTO: 1.5 % — SIGNIFICANT CHANGE UP (ref 0–6)
GLUCOSE SERPL-MCNC: 87 MG/DL — SIGNIFICANT CHANGE UP (ref 70–99)
GLUCOSE UR QL: NEGATIVE MG/DL — SIGNIFICANT CHANGE UP
HCG SERPL-ACNC: <2 MIU/ML — SIGNIFICANT CHANGE UP
HCT VFR BLD CALC: 38.7 % — SIGNIFICANT CHANGE UP (ref 34.5–45)
HGB BLD-MCNC: 12.7 G/DL — SIGNIFICANT CHANGE UP (ref 11.5–15.5)
IMM GRANULOCYTES NFR BLD AUTO: 0.3 % — SIGNIFICANT CHANGE UP (ref 0–0.9)
KETONES UR-MCNC: NEGATIVE MG/DL — SIGNIFICANT CHANGE UP
LACTATE BLDV-MCNC: 1 MMOL/L — SIGNIFICANT CHANGE UP (ref 0.5–2)
LEUKOCYTE ESTERASE UR-ACNC: NEGATIVE — SIGNIFICANT CHANGE UP
LIDOCAIN IGE QN: 43 U/L — SIGNIFICANT CHANGE UP (ref 7–60)
LYMPHOCYTES # BLD AUTO: 3.12 K/UL — SIGNIFICANT CHANGE UP (ref 1–3.3)
LYMPHOCYTES # BLD AUTO: 31.8 % — SIGNIFICANT CHANGE UP (ref 13–44)
MCHC RBC-ENTMCNC: 29.2 PG — SIGNIFICANT CHANGE UP (ref 27–34)
MCHC RBC-ENTMCNC: 32.8 G/DL — SIGNIFICANT CHANGE UP (ref 32–36)
MCV RBC AUTO: 89 FL — SIGNIFICANT CHANGE UP (ref 80–100)
MONOCYTES # BLD AUTO: 0.58 K/UL — SIGNIFICANT CHANGE UP (ref 0–0.9)
MONOCYTES NFR BLD AUTO: 5.9 % — SIGNIFICANT CHANGE UP (ref 2–14)
NEUTROPHILS # BLD AUTO: 5.9 K/UL — SIGNIFICANT CHANGE UP (ref 1.8–7.4)
NEUTROPHILS NFR BLD AUTO: 60.2 % — SIGNIFICANT CHANGE UP (ref 43–77)
NITRITE UR-MCNC: NEGATIVE — SIGNIFICANT CHANGE UP
NRBC BLD AUTO-RTO: 0 /100 WBCS — SIGNIFICANT CHANGE UP (ref 0–0)
PH UR: 7 — SIGNIFICANT CHANGE UP (ref 5–8)
PLATELET # BLD AUTO: 366 K/UL — SIGNIFICANT CHANGE UP (ref 150–400)
POTASSIUM SERPL-MCNC: 3.4 MMOL/L — LOW (ref 3.5–5.3)
POTASSIUM SERPL-SCNC: 3.4 MMOL/L — LOW (ref 3.5–5.3)
PROT SERPL-MCNC: 8 G/DL — SIGNIFICANT CHANGE UP (ref 6–8.3)
PROT UR-MCNC: NEGATIVE MG/DL — SIGNIFICANT CHANGE UP
RBC # BLD: 4.35 M/UL — SIGNIFICANT CHANGE UP (ref 3.8–5.2)
RBC # FLD: 12.6 % — SIGNIFICANT CHANGE UP (ref 10.3–14.5)
RBC CASTS # UR COMP ASSIST: 9 /HPF — HIGH (ref 0–4)
SODIUM SERPL-SCNC: 140 MMOL/L — SIGNIFICANT CHANGE UP (ref 135–145)
SP GR SPEC: 1.01 — SIGNIFICANT CHANGE UP (ref 1–1.03)
SQUAMOUS # UR AUTO: 4 /HPF — SIGNIFICANT CHANGE UP (ref 0–5)
UROBILINOGEN FLD QL: 0.2 MG/DL — SIGNIFICANT CHANGE UP (ref 0.2–1)
WBC # BLD: 9.81 K/UL — SIGNIFICANT CHANGE UP (ref 3.8–10.5)
WBC # FLD AUTO: 9.81 K/UL — SIGNIFICANT CHANGE UP (ref 3.8–10.5)
WBC UR QL: 1 /HPF — SIGNIFICANT CHANGE UP (ref 0–5)

## 2025-05-09 PROCEDURE — 71046 X-RAY EXAM CHEST 2 VIEWS: CPT | Mod: 26

## 2025-05-09 PROCEDURE — 93975 VASCULAR STUDY: CPT | Mod: 26

## 2025-05-09 PROCEDURE — 96361 HYDRATE IV INFUSION ADD-ON: CPT

## 2025-05-09 PROCEDURE — 93975 VASCULAR STUDY: CPT

## 2025-05-09 PROCEDURE — 86900 BLOOD TYPING SEROLOGIC ABO: CPT

## 2025-05-09 PROCEDURE — 80053 COMPREHEN METABOLIC PANEL: CPT

## 2025-05-09 PROCEDURE — 99285 EMERGENCY DEPT VISIT HI MDM: CPT

## 2025-05-09 PROCEDURE — 76830 TRANSVAGINAL US NON-OB: CPT | Mod: 26

## 2025-05-09 PROCEDURE — 74177 CT ABD & PELVIS W/CONTRAST: CPT

## 2025-05-09 PROCEDURE — 85025 COMPLETE CBC W/AUTO DIFF WBC: CPT

## 2025-05-09 PROCEDURE — 71046 X-RAY EXAM CHEST 2 VIEWS: CPT

## 2025-05-09 PROCEDURE — 76830 TRANSVAGINAL US NON-OB: CPT

## 2025-05-09 PROCEDURE — 96374 THER/PROPH/DIAG INJ IV PUSH: CPT | Mod: XU

## 2025-05-09 PROCEDURE — 86850 RBC ANTIBODY SCREEN: CPT

## 2025-05-09 PROCEDURE — 96375 TX/PRO/DX INJ NEW DRUG ADDON: CPT

## 2025-05-09 PROCEDURE — 83605 ASSAY OF LACTIC ACID: CPT

## 2025-05-09 PROCEDURE — 99284 EMERGENCY DEPT VISIT MOD MDM: CPT | Mod: 25

## 2025-05-09 PROCEDURE — 84702 CHORIONIC GONADOTROPIN TEST: CPT

## 2025-05-09 PROCEDURE — 74177 CT ABD & PELVIS W/CONTRAST: CPT | Mod: 26

## 2025-05-09 PROCEDURE — 81001 URINALYSIS AUTO W/SCOPE: CPT

## 2025-05-09 PROCEDURE — 83690 ASSAY OF LIPASE: CPT

## 2025-05-09 PROCEDURE — 86901 BLOOD TYPING SEROLOGIC RH(D): CPT

## 2025-05-09 RX ORDER — KETOROLAC TROMETHAMINE 30 MG/ML
30 INJECTION, SOLUTION INTRAMUSCULAR; INTRAVENOUS ONCE
Refills: 0 | Status: DISCONTINUED | OUTPATIENT
Start: 2025-05-09 | End: 2025-05-09

## 2025-05-09 RX ORDER — KETOROLAC TROMETHAMINE 30 MG/ML
15 INJECTION, SOLUTION INTRAMUSCULAR; INTRAVENOUS ONCE
Refills: 0 | Status: DISCONTINUED | OUTPATIENT
Start: 2025-05-09 | End: 2025-05-09

## 2025-05-09 RX ORDER — KETOROLAC TROMETHAMINE 30 MG/ML
1 INJECTION, SOLUTION INTRAMUSCULAR; INTRAVENOUS
Qty: 12 | Refills: 0
Start: 2025-05-09 | End: 2025-05-11

## 2025-05-09 RX ADMIN — KETOROLAC TROMETHAMINE 30 MILLIGRAM(S): 30 INJECTION, SOLUTION INTRAMUSCULAR; INTRAVENOUS at 14:27

## 2025-05-09 RX ADMIN — Medication 1000 MILLILITER(S): at 14:40

## 2025-05-09 RX ADMIN — Medication 4 MILLIGRAM(S): at 14:27

## 2025-05-09 RX ADMIN — Medication 1000 MILLILITER(S): at 16:45

## 2025-05-09 RX ADMIN — KETOROLAC TROMETHAMINE 30 MILLIGRAM(S): 30 INJECTION, SOLUTION INTRAMUSCULAR; INTRAVENOUS at 15:30

## 2025-05-09 RX ADMIN — Medication 4 MILLIGRAM(S): at 15:30

## 2025-05-09 NOTE — CONSULT NOTE ADULT - SUBJECTIVE AND OBJECTIVE BOX
KERON WOODARD  34y  Female [id number]    HPI:  34y  LMP  presenting due to abdominal discomfort after attempted IUD removal today. Patient states she went to her OBGYN for Paraguard IUD removal as she would like to attempt another pregnancy. Strings were not visualized (previously visualized 2 weeks ago) and they attempted to remove it under ultrasound guidance. She was advised to present for evaluation in the ED. Denies any current heavy vaginal bleeding, reports some spotting. Pain improved with IV analgesia in the ED. Patient already has a hysteroscopy scheduled on Wednesday at [facility] for IUD removal.    Name of GYN Physician: [facility] in [place]  POB: [id number]  Pgyn: +Endometriosis. Denies fibroids, cysts, STI's, Abnormal pap smears   PMH: Denies  PSH: dx LSC for endometriosis  Meds: Adderall PRN  All: NKDA    Vital Signs Last 24 Hrs  T(C): 36.5 (09 May 2025 20:50), Max: 37.2 (09 May 2025 14:42)  T(F): 97.7 (09 May 2025 20:50), Max: 98.9 (09 May 2025 14:42)  HR: 62 (09 May 2025 20:50) (57 - 62)  BP: 90/59 (09 May 2025 20:50) (90/59 - 110/74)  BP(mean): 70 (09 May 2025 20:50) (70 - 74)  RR: 18 (09 May 2025 20:50) (16 - 18)  SpO2: 98% (09 May 2025 20:50) (98% - 100%)    Parameters below as of 09 May 2025 20:50  Patient On (Oxygen Delivery Method): room air        Physical Exam:   General: sitting comfortably in bed, NAD   Abd: Soft, non-tender, non-distended.   :  No bleeding on pad. External labia wnl. Bimanual exam with no cervical motion tenderness, uterus wnl, adnexa non palpable b/l.  Cervix closed.  Speculum Exam: No active bleeding from os. Normal appearing cervix, no strings visualized.  Ext: non-tender b/l, no edema     LABS:                              12.7   9.81  )-----------( 366      ( 09 May 2025 14:51 )             38.7         140  |  105  |  9   ----------------------------<  87  3.4[L]   |  23  |  0.71    Ca    9.2      09 May 2025 14:51    TPro  8.0  /  Alb  4.6  /  TBili  0.1[L]  /  DBili  x   /  AST  17  /  ALT  11  /  AlkPhos  58      I&O's Detail      Urinalysis Basic - ( 09 May 2025 14:51 )    Color: Yellow / Appearance: Clear / S.010 / pH: x  Gluc: 87 mg/dL / Ketone: Negative mg/dL  / Bili: Negative / Urobili: 0.2 mg/dL   Blood: x / Protein: Negative mg/dL / Nitrite: Negative   Leuk Esterase: Negative / RBC: 9 /HPF / WBC 1 /HPF   Sq Epi: x / Non Sq Epi: 4 /HPF / Bacteria: Negative /HPF        RADIOLOGY & ADDITIONAL STUDIES:  < from: CT Abdomen and Pelvis w/ IV Cont (25 @ 18:20) >    ACC: [id number] EXAM:  CT ABDOMEN AND PELVIS IC   ORDERED BY:  LUCAS ORDAZ     PROCEDURE DATE:  2025          INTERPRETATION:  CLINICAL INFORMATION: Left lower quadrant pain    COMPARISON: None.    CONTRAST/COMPLICATIONS:  IV Contrast: Omnipaque 350  90 cc administered   10 cc discarded  Oral Contrast: NONE  .    PROCEDURE:  CT of the Abdomen and Pelvis was performed.  Sagittal and coronal reformats were performed.    FINDINGS:  LOWER CHEST: Within normal limits.    LIVER: Within normal limits.  BILE DUCTS: Normal caliber.  GALLBLADDER: Within normal limits.  SPLEEN: Within normal limits.  PANCREAS: Within normal limits.  ADRENALS: Within normal limits.  KIDNEYS/URETERS: Within normal limits.    BLADDER: Within normal limits.  REPRODUCTIVE ORGANS: The uterus and ovaries are within normal limits. An   IUD is in place which appears approximately 90 degree tilted; a pelvic   ultrasound is likely necessary to further evaluate. The cervical region   appears prominent and may also be better evaluated with a pelvic   ultrasound.    BOWEL: No bowel obstruction. Appendix is relatively long and filled with   gas and stool and best visible on the axial image [id number] as well as on   the coronal images 602-40 through 50. Stool-filled colon isnoted   suggesting constipation. Concentric thickening of the small bowel loops   as well as the stomach indicates moderate gastroenteritis.  PERITONEUM/RETROPERITONEUM: Within normal limits.  VESSELS: Within normal limits.  LYMPH NODES: No lymphadenopathy.  ABDOMINAL WALL: Within normal limits.  BONES: Within normal limits.    IMPRESSION:  Normal appendix.    Moderate gastroenteritis.    Stool-filled colon suggesting constipation.    An IUD is in place which appears approximately 90 degree tilted and   appears in a horizontal rather than a vertical position; a pelvic   ultrasound is likely necessary to further evaluate.    The cervical region appears prominent and may also be better evaluated   with a pelvic ultrasound.    --- End of Report ---            MELANIE STACY MD; Attending Radiologist  This document has been electronically signed. May  9 2025  6:27PM    < end of copied text >  < from: US Doppler Pelvis (25 @ 17:22) >    ACC: [id number] EXAM:  US DPLX PELVIC   ORDERED BY:  LUCAS ORDAZ     ACC: [id number] EXAM:  US TRANSVAGINAL   ORDERED BY:  LUCAS ORDAZ     PROCEDURE DATE:  2025          INTERPRETATION:  CLINICAL INFORMATION: 34-year-old female with indwelling   IUD, pelvic pain and bleeding.    LMP: 2025    COMPARISON: None available.    TECHNIQUE:  Endovaginal and transabdominal pelvic sonogram. Color and Spectral   Doppler was performed. 3-D images are acquired to better assess location   of the IUD.    FINDINGS:  Uterus: 8.5 cm x 4.9 cm x 5.9 cm. The uterus is anteverted. No fibroids   are identified.  Endometrium: The IUD is visualized in the uterus; however, it is   malpositioned. The IUD is upside down and tilted in orientation. The side   arms are located within the left side of the endometrial cavity; however,   the stem is directed superiorly towards the right uterine cornu and   extends out of the endometrium and into the right fundal myometrium. The   IUD  approaches the serosal surface of the uterus but there is no   definite evidence for perforation. There is a gap measuring approximately   5 mm in length which separates the distal tip of the IUD stem, which is   located in the myometrium, from the remainder of the stem. This is   concerning for possible fracture of the stem with a separate fragment   located within the myometrium. There is trace fluid within the   endometrial cavity. In double layer thickness, the endometrium measures   approximately 4 mm.    Right ovary: 2.9 cm x 1.9 cm x 3.1 cm. Within normal limits, containing   small follicles. Normal arterial and venous waveforms.  Left ovary: 3.2 cm x 1.9 cm x 3.0 cm. Within normal limits, containing   small follicles.. Normal arterial and venous waveforms.    Fluid: Small amount of pelvic free fluid.    IMPRESSION:    1. Malpositioned IUD which is upside down and tilted in orientation with   the stem extending superiorly into the myometrium of the right cornual   region of the uterus. There is a gap noted between the distal tip of the   stem and the more proximal segment of the stem such that fracture of the   stem with a separate fragment within the myometrium is of concern.   Noncontrast CT of the pelvis could be performed for further evaluation.  2. The ovaries are unremarkable containing follicles.  3. Small amount of pelvic free fluid.    Gynecologic consultation is recommended for further evaluation and   management.    Findings were discussed with Dr. Etienne 2025 6:06 PM by Dr. Francisco with   read back confirmation.    --- End of Report ---            PRACHI FRANCISCO MD; Attending Radiologist  This document has been electronically signed. May  9 2025  6:13PM    < end of copied text >
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
## 2025-05-09 NOTE — ED PROVIDER NOTE - PROGRESS NOTE DETAILS
Dr. Sam:  I have assumed care of this patient. I haveparticipated in the care of this patient. I have made amendments to the documentation where appropriate and have supervised the ongoing plan of care enacted by the Fellow/Resident/ACP/Student. Pending CTAP and TVUS to eval for uterine perforation in setting of malpositioned IUD and pelvic pain. Has appt to have removed under conscious  sedation as an OP. SARANYA Etienne MD PGY-2: Called by radiology, was informed of  malpositioned IUD, upside down and partially possibly broken, into myometrium.  Gyn consulted Dr. Sam:  Seen by OBGYN. Pelvic performed. No strings seen. Uterus non tender to palp. CT not concerning for uterine perforation. Stable for DC w OP FU for IUD removal under sedation.

## 2025-05-09 NOTE — ED PROVIDER NOTE - WET READ LAUNCH FT
Full range of motion of upper and lower extremities, no joint tenderness/swelling.
There are no Wet Read(s) to document.

## 2025-05-09 NOTE — CONSULT NOTE ADULT - TIME BILLING
Ob/gyn /Attending:    Pt is a 33y/o P1, LMP , presenting to the ED 2nd to abdominal discomfort after failed attempt at an IUD removal today in her Gyn office.  Pt discussed w/ me; chart reviewed; I have read and reviewed the above Resident's assessment and plan and I agree.    Patient states she went to her OBGYN for Paraguard IUD removal as she would like to attempt another pregnancy and the strings were not visualized and they attempted to remove it under ultrasound guidance, but was unsuccessful. Pt says she was advised to present for evaluation in the ED. Pt denies any VB, but reports some spotting. Pt previously w/ abdominal pain , but improved with IV analgesia in the ED.     Pt w/ hx of  x1; hx of Endometriosis (diagnosed by LS).    VSS, afebrile  PE per Resident: benign; no IUD strings visualized; no active bleeding.    Labs: H/H: 12.7/38.7; Plt: 361; O+; CMP: wnl    CT: IMPRESSION: An IUD is in place which appears approximately 90 degree tilted and appears in a horizontal rather than a vertical position; a pelvic ultrasound is likely necessary to further evaluate. Moderate gastroenteritis. Stool-filled colon suggesting constipation.    Sono: IMPRESSION: 1. Malpositioned IUD which is upside down and tilted in orientation with the stem extending superiorly into the myometrium of the right cornual region of the uterus. There is a gap noted between the distal tip of the stem and the more proximal segment of the stem such that fracture of the stem with a separate fragment within the myometrium is of concern. Noncontrast CT of the pelvis could be performed for further evaluation. 2. The ovaries are unremarkable containing follicles.  3. Small amount of pelvic free fluid.    A/P:   -33y/o P1, LMP 4, presenting to the ED 2nd to abdominal discomfort after failed attempt at an IUD removal today in her Gyn office  -Pt with malpositioned Paragard IUD, but not perforated through the uterus.  No Gyn intervention needed at this time.  -Pt to f/u with her Gyn and already has a hysteroscopy scheduled on Wednesday at [facility] for IUD removal.  -Continued management as per ED.    Thank you for the consult; please call if any further questions.    Dr. Angel

## 2025-05-09 NOTE — ED PROVIDER NOTE - OBJECTIVE STATEMENT
34-year-old female with a history of endometriosis, scheduled to remove IUD   last Wednesday, had preop labs done and the procedure was canceled because she could not tolerate without conscious sedation, she had a pelvic sonogram done at that day which revealed that IUD is tilted, and she needed procedure done under anesthesia, she is scheduled for the next procedure next Wednesday, but she cannot wait because of the severe pain mostly on the left lower quadrant unable to walk, unable to drive, denies fever or chills no discharge from vagina ,states that  GYN did not supply her with the pain medication and she would like to have the procedure done today (2) well flexed

## 2025-05-09 NOTE — CONSULT NOTE ADULT - ASSESSMENT
A/P: 34y  LMP  presenting due to abdominal discomfort after attempted IUD removal today. Patient states she went to her OBGYN for Paraguard IUD removal as she would like to attempt another pregnancy. Strings were not visualized (previously visualized 2 weeks ago) and they attempted to remove it under ultrasound guidance. She was advised to present for evaluation in the ED. Denies any current heavy vaginal bleeding, reports some spotting. Pain improved with IV analgesia in the ED. In ED vital signs wnl. Laboratory evaluation grossly wnl. On exam, uterus nontender. Cervix normal appearing without strings visualized. No active bleeding from os. TVUS with malpositioned IUD with stem extending superiorly into the myometrium of the right cornual region of the uterus with possible concern for fracture of IUD stem. No evidence of perforation through serosal surface. CT A/P with moderate gastroenteritis and stool-filled colon as well as again malpositioned IUD. Patient overall comfortable at this time. Patient appears hemodynamically stable without evidence of perforation with already established outpatient follow-up.    - No acute GYN intervention  - Patient to follow up for scheduled hysteroscopy for IUD removal at [facility] on   - Patient given strict return precautions  - Patient cleared for discharge from GYN perspective, remainder of management per ED    d/w Dr. Jeanne Jama PGY-2

## 2025-05-09 NOTE — ED ADULT NURSE NOTE - OBJECTIVE STATEMENT
34F aaox4 ambulatory with no PMHx, p/w c/o low pelvic pain which has been going on for more than 2 weeks now. Patient reports she went to an Ob/gyn clinic to get her IUD out because she wanted to get pregnant, patient had an IUD for 11 years, had 1 kid at home. Saw her Ob/gyn and tried to look for the IUD but never find the IUD, re-scheduled her

## 2025-05-09 NOTE — ED PROVIDER NOTE - CLINICAL SUMMARY MEDICAL DECISION MAKING FREE TEXT BOX
34-year-old female was scheduled for removal of IUD next Wednesday under conscious sedation, presented to emergency room with severe left lower quadrant pain difficulty walking or driving, physical exam significant for marked tenderness in the left lower quadrant with rebound and guarding, declined pelvic exam because of the pain, concern for perforation, will obtain CAT scan and pelvic sonogram IV hydration pain medication and reassess ZR

## 2025-05-09 NOTE — CONSULT NOTE ADULT - TIME-BASED
Patient sent email    Thank you! 90 day supply is fine, just correct prescribing info.   Abbey Hagan        
20

## 2025-05-09 NOTE — ED PROVIDER NOTE - NSFOLLOWUPINSTRUCTIONS_ED_ALL_ED_FT
You have been evaluated in the Emergency Department today. Your evaluation did not show evidence of medical conditions requiring emergent intervention at this time.  Your CT shows moderate gastroenteritis.    Please schedule an appointment with your primary care physician and OB/GYN. Bring copies of your results with you (provided in your discharge paperwork).     Please follow up with your OB/GYN in the next week. You have an appointment already scheduled. Please do not cancel or skip your appointment.       Return to the Emergency Department if you experience worsening or uncontrolled pain, fevers 100.4°F or greater, recurrent vomiting, inability to tolerate food or fluids by mouth, bloody stools or vomit, black or tarry stools, or any other concerning symptoms.      Gastroenteritis, or stomach flu, is an infection of the stomach and intestines. Causes may include bacteria, parasites, viruses, chemicals, or reactions to medicines.  Digestive Tract    DISCHARGE INSTRUCTIONS:    Seek care immediately if:    You see blood in your vomit or diarrhea.    You cannot stop vomiting.    You have severe abdominal pain, or your abdomen is swollen.    You have not urinated for 12 hours.    You feel like you are going to faint.  Call your doctor if:    You have a fever that does not go away.    You continue to vomit or have diarrhea, even after treatment.    You see worms in your diarrhea.    Your mouth or eyes are dry. You are not urinating as much or as often.    You have questions or concerns about your condition or care.  Medicines:    Medicines may be given to stop vomiting or diarrhea, decrease abdominal cramps, or treat an infection.    Take your medicine as directed. Contact your healthcare provider if you think your medicine is not helping or if you have side effects. Tell your provider if you are allergic to any medicine. Keep a list of the medicines, vitamins, and herbs you take. Include the amounts, and when and why you take them. Bring the list or the pill bottles to follow-up visits. Carry your medicine list with you in case of an emergency.  Manage your symptoms:    Drink liquids as directed. Ask your healthcare provider how much liquid to drink each day, and which liquids are best for you. You may also need to drink an oral rehydration solution (ORS). An ORS has the right amounts of sugar, salt, and minerals in water to replace body fluids.    Eat bland foods. When you feel hungry, begin eating soft, bland foods. Examples are bananas, clear soup, potatoes, and applesauce. Do not have dairy products, alcohol, sugary drinks, or drinks with caffeine until you feel better. Avoid eating high-fat or fast foods.    Eat small, frequent meals throughout the day. Your stomach may tolerate small meals every 2 to 3 hours instead of 3 large meals.    Rest as much as possible. Slowly start to do more each day when you begin to feel better.  Prevent the spread of gastroenteritis: Gastroenteritis can spread easily. Keep yourself, your family, and your surroundings clean to help prevent the spread of gastroenteritis:    Wash your hands often. Use soap and water. Wash your hands after you use the bathroom, change a child's diapers, or sneeze. Wash your hands before you prepare or eat food.  Handwashing      Clean surfaces and do laundry often. Wash your clothes and towels separately from the rest of the laundry. Clean surfaces in your home with antibacterial  or bleach.    Clean food thoroughly and cook safely. Wash raw vegetables before you cook. Cook meat, fish, and eggs fully. Do not use the same dishes for raw meat as you do for other foods. Refrigerate any leftover food immediately.    Be aware when you camp or travel. Drink only clean water. Do not drink from rivers or lakes unless you purify or boil the water first. When you travel, drink bottled water and do not add ice. Do not eat fruit that has not been peeled. Do not eat raw fish or meat that is not fully cooked.

## 2025-05-09 NOTE — ED PROVIDER NOTE - PATIENT PORTAL LINK FT
You can access the FollowMyHealth Patient Portal offered by [facility] by registering at the following website: [email or web address]. By joining [email or web address]’s FollowMyHealth portal, you will also be able to view your health information using other applications (apps) compatible with our system.

## 2025-05-09 NOTE — ED PROVIDER NOTE - CARE PLAN
Principal Discharge DX:	Abdominal pain   1 Principal Discharge DX:	Gastroenteritis   Principal Discharge DX:	IUD complication